# Patient Record
Sex: FEMALE | Race: WHITE | NOT HISPANIC OR LATINO | Employment: FULL TIME | URBAN - METROPOLITAN AREA
[De-identification: names, ages, dates, MRNs, and addresses within clinical notes are randomized per-mention and may not be internally consistent; named-entity substitution may affect disease eponyms.]

---

## 2018-01-29 ENCOUNTER — OFFICE VISIT (OUTPATIENT)
Dept: URGENT CARE | Facility: CLINIC | Age: 53
End: 2018-01-29
Payer: COMMERCIAL

## 2018-01-29 VITALS
WEIGHT: 209 LBS | DIASTOLIC BLOOD PRESSURE: 78 MMHG | TEMPERATURE: 99.9 F | HEIGHT: 68 IN | BODY MASS INDEX: 31.67 KG/M2 | OXYGEN SATURATION: 97 % | HEART RATE: 96 BPM | RESPIRATION RATE: 18 BRPM | SYSTOLIC BLOOD PRESSURE: 159 MMHG

## 2018-01-29 DIAGNOSIS — H10.32 ACUTE BACTERIAL CONJUNCTIVITIS OF LEFT EYE: Primary | ICD-10-CM

## 2018-01-29 PROCEDURE — G0382 LEV 3 HOSP TYPE B ED VISIT: HCPCS | Performed by: FAMILY MEDICINE

## 2018-01-29 RX ORDER — OFLOXACIN 3 MG/ML
2 SOLUTION/ DROPS OPHTHALMIC 4 TIMES DAILY
Qty: 5 ML | Refills: 0 | Status: SHIPPED | OUTPATIENT
Start: 2018-01-29

## 2018-01-29 RX ORDER — ATORVASTATIN CALCIUM 10 MG/1
10 TABLET, FILM COATED ORAL DAILY
COMMUNITY

## 2018-01-29 NOTE — LETTER
January 29, 2018     Patient: Jessica Cerna   YOB: 1965   Date of Visit: 1/29/2018       To Whom It May Concern: It is my medical opinion that Jessica Cerna may return to work on 01/31/2018  If you have any questions or concerns, please don't hesitate to call           Sincerely,        Sean Borges DO    CC: No Recipients

## 2018-01-30 NOTE — PROGRESS NOTES
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  She denies any true eye pain  She does not wear contacts  She denies any other associated symptoms  She denies any blurred vision  Subjective:   Review of Systems   Constitutional: Negative  HENT: Negative for congestion, ear discharge, ear pain, hearing loss, rhinorrhea, sinus pain, sinus pressure, sore throat, tinnitus, trouble swallowing and voice change  Eyes: Positive for discharge  Respiratory: Negative  Cardiovascular: Negative  Gastrointestinal: Negative  Genitourinary: Negative  Musculoskeletal: Negative  Skin: Negative  Neurological: Negative  Hematological: Negative  Objective:  /78 (BP Location: Right arm, Patient Position: Sitting, Cuff Size: Adult)   Pulse 96   Temp 99 9 °F (37 7 °C) (Tympanic)   Resp 18   Ht 5' 8" (1 727 m)   Wt 94 8 kg (209 lb)   SpO2 97%   BMI 31 78 kg/m²   Physical Exam   Constitutional: She is oriented to person, place, and time  She appears well-developed  HENT:   Head: Normocephalic  Right Ear: External ear normal    Left Ear: External ear normal    Mouth/Throat: Oropharynx is clear and moist    Eyes: EOM are normal  Pupils are equal, round, and reactive to light  Right eye exhibits discharge  Left eye with scant discharge  Positive conjunctivitis  Funduscopic exam shows anterior chamber is clear otherwise limited because of pupillary constriction  Neck: Normal range of motion  Neck supple  No thyromegaly present  Cardiovascular: Normal rate, regular rhythm, normal heart sounds and intact distal pulses      Pulmonary/Chest: Effort normal and breath sounds normal  Abdominal: Soft  Bowel sounds are normal  There is no tenderness  Musculoskeletal: Normal range of motion  Neurological: She is alert and oriented to person, place, and time  She has normal reflexes  Skin: Skin is warm and dry  Psychiatric: She has a normal mood and affect  Vitals reviewed  Assessment:  Diagnoses and all orders for this visit:    Acute bacterial conjunctivitis of left eye  -     ofloxacin (OCUFLOX) 0 3 % ophthalmic solution; Administer 2 drops into the left eye 4 (four) times a day    Other orders  -     atorvastatin (LIPITOR) 10 mg tablet; Take 10 mg by mouth daily        Plan:  Patient Instructions   Use Ocuflox eyedrops as directed  Good hand washing and hygiene  Follow up with your  family doctor symptoms persist or worsen       Wilmar Dunn DO  1/29/2018,7:54 PM    Portions of the record may have been created with voice recognition software   Occasional wrong word or "sound a like" substitutions may have occurred due to the inherent limitations of voice recognition software   Read the chart carefully and recognize, using context, where substitutions have occurred

## 2018-01-30 NOTE — PATIENT INSTRUCTIONS
Use Ocuflox eyedrops as directed  Good hand washing and hygiene    Follow up with your  family doctor symptoms persist or worsen

## 2019-01-09 ENCOUNTER — OFFICE VISIT (OUTPATIENT)
Dept: URGENT CARE | Facility: CLINIC | Age: 54
End: 2019-01-09
Payer: COMMERCIAL

## 2019-01-09 VITALS
TEMPERATURE: 97.6 F | SYSTOLIC BLOOD PRESSURE: 179 MMHG | WEIGHT: 222 LBS | OXYGEN SATURATION: 97 % | HEART RATE: 80 BPM | HEIGHT: 68 IN | RESPIRATION RATE: 18 BRPM | BODY MASS INDEX: 33.65 KG/M2 | DIASTOLIC BLOOD PRESSURE: 90 MMHG

## 2019-01-09 DIAGNOSIS — B02.9 HERPES ZOSTER WITHOUT COMPLICATION: Primary | ICD-10-CM

## 2019-01-09 PROCEDURE — G0382 LEV 3 HOSP TYPE B ED VISIT: HCPCS | Performed by: PHYSICIAN ASSISTANT

## 2019-01-09 RX ORDER — VALACYCLOVIR HYDROCHLORIDE 1 G/1
1000 TABLET, FILM COATED ORAL 3 TIMES DAILY
Qty: 21 TABLET | Refills: 0 | Status: SHIPPED | OUTPATIENT
Start: 2019-01-09 | End: 2019-01-16

## 2019-01-09 RX ORDER — LIDOCAINE 50 MG/G
1 PATCH TOPICAL DAILY
Qty: 30 PATCH | Refills: 0 | Status: SHIPPED | OUTPATIENT
Start: 2019-01-09

## 2019-01-09 RX ORDER — PREDNISONE 50 MG/1
50 TABLET ORAL DAILY
Qty: 5 TABLET | Refills: 0 | Status: SHIPPED | OUTPATIENT
Start: 2019-01-09 | End: 2019-01-14

## 2019-01-09 NOTE — LETTER
January 9, 2019     Patient: Jarvis Beltran   YOB: 1965   Date of Visit: 1/9/2019       To Whom It May Concern: It is my medical opinion that Jarvis Beltran may return to work on 01/12/2019  If you have any questions or concerns, please don't hesitate to call           Sincerely,        Delroy Clark PA-C    CC: No Recipients

## 2019-01-09 NOTE — PROGRESS NOTES
[id number]        NAME: Cody Call is a 52 y o  female  : 1965    MRN: [id number]  DATE: 2019  TIME: 10:38 AM    Assessment and Plan   Herpes zoster without complication [Z60 5]  1  Herpes zoster without complication  valACYclovir (VALTREX) 1,000 mg tablet    predniSONE 50 mg tablet    lidocaine (LIDODERM) 5 %     Patient Instructions     Take medicine as prescribed  Follow up with PCP in 3-5 days  Proceed to  ER if symptoms worsen  Chief Complaint     Chief Complaint   Patient presents with    Rash     Possible shingles  Rash on left breast and torso  Onset 7 days ago     History of Present Illness     Rash   This is a new problem  The current episode started yesterday  The problem has been gradually worsening since onset  The rash is diffuse  The rash is characterized by blistering, redness and pain  She was exposed to nothing  Pertinent negatives include no anorexia, congestion, cough, diarrhea, eye pain, facial edema, fatigue, fever, joint pain, nail changes, rhinorrhea, shortness of breath, sore throat or vomiting  Past treatments include nothing  There is no history of allergies, asthma, eczema or varicella  Review of Systems   Review of Systems   Constitutional: Negative for activity change, appetite change, chills, diaphoresis, fatigue, fever and unexpected weight change  HENT: Negative for congestion, rhinorrhea and sore throat  Eyes: Negative for pain  Respiratory: Negative for apnea, cough, choking, chest tightness, shortness of breath, wheezing and stridor  Cardiovascular: Negative for chest pain, palpitations and leg swelling  Gastrointestinal: Negative for anorexia, diarrhea and vomiting  Musculoskeletal: Negative for joint pain  Skin: Positive for rash  Negative for color change, nail changes, pallor and wound           Current Medications       Current Outpatient Prescriptions:     atorvastatin (LIPITOR) 10 mg tablet, Take 10 mg by mouth daily, Disp: , Rfl:     lidocaine (LIDODERM) 5 %, Apply 1 patch topically daily Remove & Discard patch within 12 hours or as directed by MD, Disp: 30 patch, Rfl: 0    ofloxacin (OCUFLOX) 0 3 % ophthalmic solution, Administer 2 drops into the left eye 4 (four) times a day (Patient not taking: Reported on 1/9/2019 ), Disp: 5 mL, Rfl: 0    predniSONE 50 mg tablet, Take 1 tablet (50 mg total) by mouth daily for 5 days, Disp: 5 tablet, Rfl: 0    valACYclovir (VALTREX) 1,000 mg tablet, Take 1 tablet (1,000 mg total) by mouth 3 (three) times a day for 7 days, Disp: 21 tablet, Rfl: 0    Current Allergies     Allergies as of 01/09/2019    (No Known Allergies)            The following portions of the patient's history were reviewed and updated as appropriate: allergies, current medications, past family history, past medical history, past social history, past surgical history and problem list      Past Medical History:   Diagnosis Date    Cellulitis     right leg    Hypercholesteremia        History reviewed  No pertinent surgical history  History reviewed  No pertinent family history  Medications have been verified  Objective   BP (!) 179/90   Pulse 80   Temp 97 6 °F (36 4 °C) (Tympanic)   Resp 18   Ht 5' 8" (1 727 m)   Wt 101 kg (222 lb)   SpO2 97%   BMI 33 75 kg/m²        Physical Exam     Physical Exam   Constitutional: She appears well-developed and well-nourished  Cardiovascular: Normal rate, regular rhythm and normal heart sounds  Exam reveals no gallop and no friction rub  No murmur heard  Pulmonary/Chest: Effort normal and breath sounds normal  No respiratory distress  She has no wheezes  She has no rales     Skin: